# Patient Record
Sex: MALE | Race: WHITE | NOT HISPANIC OR LATINO | URBAN - METROPOLITAN AREA
[De-identification: names, ages, dates, MRNs, and addresses within clinical notes are randomized per-mention and may not be internally consistent; named-entity substitution may affect disease eponyms.]

---

## 2019-12-18 ENCOUNTER — APPOINTMENT (OUTPATIENT)
Dept: URBAN - METROPOLITAN AREA CLINIC 220 | Age: 19
Setting detail: DERMATOLOGY
End: 2019-12-30

## 2019-12-18 ENCOUNTER — RX ONLY (RX ONLY)
Age: 19
End: 2019-12-18

## 2019-12-18 DIAGNOSIS — L40.4 GUTTATE PSORIASIS: ICD-10-CM

## 2019-12-18 PROCEDURE — OTHER COUNSELING: OTHER

## 2019-12-18 PROCEDURE — OTHER TREATMENT REGIMEN: OTHER

## 2019-12-18 PROCEDURE — OTHER PRESCRIPTION: OTHER

## 2019-12-18 PROCEDURE — 99202 OFFICE O/P NEW SF 15 MIN: CPT

## 2019-12-18 PROCEDURE — OTHER MIPS QUALITY: OTHER

## 2019-12-18 RX ORDER — TRIAMCINOLONE ACETONIDE 1 MG/G
CREAM TOPICAL
Qty: 1 | Refills: 1 | Status: ERX | COMMUNITY
Start: 2019-12-18

## 2019-12-18 RX ORDER — BETAMETHASONE DIPROPIONATE 0.5 MG/G
SPRAY TOPICAL
Qty: 1 | Refills: 3 | Status: ERX

## 2019-12-18 ASSESSMENT — LOCATION SIMPLE DESCRIPTION DERM: LOCATION SIMPLE: CHEST

## 2019-12-18 ASSESSMENT — LOCATION ZONE DERM: LOCATION ZONE: TRUNK

## 2019-12-18 ASSESSMENT — LOCATION DETAILED DESCRIPTION DERM: LOCATION DETAILED: LEFT MEDIAL INFERIOR CHEST

## 2019-12-18 NOTE — PROCEDURE: MIPS QUALITY
Quality 431: Preventive Care And Screening: Unhealthy Alcohol Use - Screening: Patient screened for unhealthy alcohol use using a single question and scores less than 2 times per year
Quality 226: Preventive Care And Screening: Tobacco Use: Screening And Cessation Intervention: Patient screened for tobacco use and is an ex/non-smoker
Detail Level: Detailed
Quality 131: Pain Assessment And Follow-Up: Pain assessment using a standardized tool is documented as negative, no follow-up plan required
Quality 130: Documentation Of Current Medications In The Medical Record: Current Medications Documented
Quality 110: Preventive Care And Screening: Influenza Immunization: Influenza immunization was not ordered or administered, reason not given

## 2019-12-18 NOTE — PROCEDURE: TREATMENT REGIMEN
Samples Given: Cloderm - apply to rash on face twice a day for a few days at a time.  (Samples only)\\nSernivo spray (until he receives his rx)

## 2019-12-18 NOTE — PROCEDURE: TREATMENT REGIMEN
Start Regimen: Sernivo 0.05 % topical spray with pump \\nDays Supply: 30\\nSig: Apply to rash on body twice a day for up to 2 weeks at a time.  PRN flares. Initiate Regimen: Sernivo 0.05 % topical spray with pump \\nDays Supply: 30\\nSig: Apply to rash on body twice a day for up to 2 weeks at a time.  PRN flares.

## 2019-12-18 NOTE — PROCEDURE: TREATMENT REGIMEN
Other Instructions: Discussed with mom and patient the different options of treatment for his condition.  Patient was given a regimen and if rash is not resolved in 2 weeks prior to returning to school patient was advised to return to the office. Plan: Discussed with mom and patient the different options of treatment for his condition.  Patient was given a regimen and if rash is not resolved in 2 weeks prior to returning to school patient was advised to return to the office.

## 2020-01-31 ENCOUNTER — APPOINTMENT (OUTPATIENT)
Dept: URBAN - METROPOLITAN AREA CLINIC 211 | Age: 20
Setting detail: DERMATOLOGY
End: 2020-01-31

## 2020-01-31 DIAGNOSIS — L40.0 PSORIASIS VULGARIS: ICD-10-CM

## 2020-01-31 PROCEDURE — OTHER COUNSELING: OTHER

## 2020-01-31 PROCEDURE — OTHER TREATMENT REGIMEN: OTHER

## 2020-01-31 PROCEDURE — OTHER PRESCRIPTION: OTHER

## 2020-01-31 PROCEDURE — 99213 OFFICE O/P EST LOW 20 MIN: CPT

## 2020-01-31 PROCEDURE — OTHER MIPS QUALITY: OTHER

## 2020-01-31 RX ORDER — TRIAMCINOLONE ACETONIDE 1 MG/G
CREAM TOPICAL
Qty: 1 | Refills: 0 | Status: ERX

## 2020-01-31 ASSESSMENT — LOCATION SIMPLE DESCRIPTION DERM: LOCATION SIMPLE: CHEST

## 2020-01-31 ASSESSMENT — LOCATION ZONE DERM: LOCATION ZONE: TRUNK

## 2020-01-31 ASSESSMENT — LOCATION DETAILED DESCRIPTION DERM: LOCATION DETAILED: LEFT MEDIAL INFERIOR CHEST

## 2020-01-31 NOTE — PROCEDURE: TREATMENT REGIMEN
Plan: Discussed the following -\\ndiscontinuing all alcohol intake\\npossible MTX vs. Biologics \\n\\nRecommended tanning bed used 2-3 times weekly. Also submitting [employer] form for home UVB panel Other Instructions: Discussed the following -\\ndiscontinuing all alcohol intake\\npossible MTX vs. Biologics \\n\\nRecommended tanning bed used 2-3 times weekly. Also submitting [employer] form for home UVB panel

## 2020-01-31 NOTE — PROCEDURE: TREATMENT REGIMEN
Continue Regimen: triamcinolone acetonide 0.1 % topical cream (local pharmacy) - Apply to extremities twice daily x2 weeks PRN flares

## 2020-02-10 RX ORDER — TRIAMCINOLONE ACETONIDE 1 MG/G
CREAM TOPICAL
Qty: 1 | Refills: 0 | Status: ERX

## 2020-03-09 ENCOUNTER — APPOINTMENT (OUTPATIENT)
Dept: URBAN - METROPOLITAN AREA CLINIC 220 | Age: 20
Setting detail: DERMATOLOGY
End: 2020-03-10

## 2020-03-09 DIAGNOSIS — B07.8 OTHER VIRAL WARTS: ICD-10-CM

## 2020-03-09 DIAGNOSIS — L40.0 PSORIASIS VULGARIS: ICD-10-CM

## 2020-03-09 PROCEDURE — OTHER COUNSELING: OTHER

## 2020-03-09 PROCEDURE — OTHER LIQUID NITROGEN: OTHER

## 2020-03-09 PROCEDURE — OTHER TREATMENT REGIMEN: OTHER

## 2020-03-09 PROCEDURE — 17110 DESTRUCT B9 LESION 1-14: CPT

## 2020-03-09 PROCEDURE — OTHER OTEZLA INITIATION: OTHER

## 2020-03-09 PROCEDURE — 99214 OFFICE O/P EST MOD 30 MIN: CPT | Mod: 25

## 2020-03-09 PROCEDURE — OTHER MIPS QUALITY: OTHER

## 2020-03-09 PROCEDURE — OTHER PRESCRIPTION: OTHER

## 2020-03-09 RX ORDER — CALCIPOTRIENE 50 UG/G
AEROSOL, FOAM TOPICAL
Qty: 1 | Refills: 5 | Status: ERX | COMMUNITY
Start: 2020-03-09

## 2020-03-09 RX ORDER — HALOBETASOL PROPIONATE 0.5 MG/G
AEROSOL, FOAM TOPICAL
Qty: 1 | Refills: 5 | Status: ERX | COMMUNITY
Start: 2020-03-09

## 2020-03-09 ASSESSMENT — LOCATION ZONE DERM
LOCATION ZONE: LEG
LOCATION ZONE: ARM
LOCATION ZONE: FINGER
LOCATION ZONE: TRUNK

## 2020-03-09 ASSESSMENT — LOCATION SIMPLE DESCRIPTION DERM
LOCATION SIMPLE: LEFT THIGH
LOCATION SIMPLE: RIGHT UPPER BACK
LOCATION SIMPLE: RIGHT UPPER ARM
LOCATION SIMPLE: LEFT THUMB
LOCATION SIMPLE: LEFT FOREARM
LOCATION SIMPLE: RIGHT THIGH

## 2020-03-09 ASSESSMENT — LOCATION DETAILED DESCRIPTION DERM
LOCATION DETAILED: RIGHT ANTERIOR PROXIMAL THIGH
LOCATION DETAILED: LEFT ANTERIOR PROXIMAL THIGH
LOCATION DETAILED: RIGHT SUPERIOR UPPER BACK
LOCATION DETAILED: LEFT DISTAL ULNAR THUMB
LOCATION DETAILED: LEFT VENTRAL DISTAL FOREARM
LOCATION DETAILED: RIGHT ANTERIOR PROXIMAL UPPER ARM

## 2020-03-09 ASSESSMENT — BSA PSORIASIS: % BODY COVERED IN PSORIASIS: 85

## 2020-03-09 NOTE — PROCEDURE: MIPS QUALITY
Quality 131: Pain Assessment And Follow-Up: Pain assessment using a standardized tool is documented as negative, no follow-up plan required
Quality 226: Preventive Care And Screening: Tobacco Use: Screening And Cessation Intervention: Patient screened for tobacco use and is an ex/non-smoker
Detail Level: Detailed
Quality 110: Preventive Care And Screening: Influenza Immunization: Influenza immunization was not ordered or administered, reason not given
Quality 130: Documentation Of Current Medications In The Medical Record: Current Medications Documented
Quality 431: Preventive Care And Screening: Unhealthy Alcohol Use - Screening: Patient screened for unhealthy alcohol use using a single question and scores less than 2 times per year

## 2020-03-09 NOTE — PROCEDURE: LIQUID NITROGEN
Consent: The patient's consent was obtained including but not limited to risks of crusting, scabbing, blistering, scarring, darker or lighter pigmentary change, recurrence, incomplete removal and infection.
Render Post-Care Instructions In Note?: no
Detail Level: Simple
Post-Care Instructions: I reviewed with the patient in detail post-care instructions. Patient is to wear sunprotection, and avoid picking at any of the treated lesions. Pt may apply Vaseline to crusted or scabbing areas.
Medical Necessity Clause: This procedure was medically necessary because the lesions that were treated were:
Medical Necessity Information: It is in your best interest to select a reason for this procedure from the list below. All of these items fulfill various CMS LCD requirements except the new and changing color options.

## 2020-03-09 NOTE — PROCEDURE: TREATMENT REGIMEN
Plan: 1. Initiate Otezla- paperwork signed and starter pack provided- discussed trial of 3-4 months before determining if working or not. \\n2. Lab Slip given for CBC with diff, CMP- will have drawn today \\n3. Initiate Lexette-BID  to Your RX pharmacy\\n4. Initiate Sorilux-BID to Your RX pharmacy Other Instructions: 1. Initiate Otezla- paperwork signed and starter pack provided- discussed trial of 3-4 months before determining if working or not. \\n2. Lab Slip given for CBC with diff, CMP- will have drawn today \\n3. Initiate Lexette-BID  to Your RX pharmacy\\n4. Initiate Sorilux-BID to Your RX pharmacy

## 2020-03-10 ENCOUNTER — RX ONLY (RX ONLY)
Age: 20
End: 2020-03-10

## 2020-10-22 ENCOUNTER — APPOINTMENT (OUTPATIENT)
Dept: URBAN - METROPOLITAN AREA CLINIC 211 | Age: 20
Setting detail: DERMATOLOGY
End: 2020-10-22

## 2020-10-22 DIAGNOSIS — B07.8 OTHER VIRAL WARTS: ICD-10-CM

## 2020-10-22 PROCEDURE — 17110 DESTRUCT B9 LESION 1-14: CPT

## 2020-10-22 PROCEDURE — OTHER MIPS QUALITY: OTHER

## 2020-10-22 PROCEDURE — OTHER COUNSELING: OTHER

## 2020-10-22 PROCEDURE — OTHER LIQUID NITROGEN: OTHER

## 2020-10-22 ASSESSMENT — LOCATION DETAILED DESCRIPTION DERM
LOCATION DETAILED: LEFT DISTAL RADIAL THUMB
LOCATION DETAILED: LEFT DISTAL ULNAR THUMB
LOCATION DETAILED: LEFT RADIAL PALM

## 2020-10-22 ASSESSMENT — LOCATION ZONE DERM
LOCATION ZONE: FINGER
LOCATION ZONE: HAND

## 2020-10-22 ASSESSMENT — LOCATION SIMPLE DESCRIPTION DERM
LOCATION SIMPLE: LEFT THUMB
LOCATION SIMPLE: LEFT HAND

## 2020-10-22 NOTE — PROCEDURE: LIQUID NITROGEN
Post-Care Instructions: I reviewed with the patient in detail post-care instructions. Patient is to wear sunprotection, and avoid picking at any of the treated lesions. Pt may apply Vaseline to crusted or scabbing areas.
Pared With?: 15 blade
Consent: The patient's consent was obtained including but not limited to risks of crusting, scabbing, blistering, scarring, darker or lighter pigmentary change, recurrence, incomplete removal and infection.
Medical Necessity Information: It is in your best interest to select a reason for this procedure from the list below. All of these items fulfill various CMS LCD requirements except the new and changing color options.
Include Z78.9 (Other Specified Conditions Influencing Health Status) As An Associated Diagnosis?: No
Detail Level: Detailed
Medical Necessity Clause: This procedure was medically necessary because the lesions that were treated were:

## 2020-10-22 NOTE — PROCEDURE: MIPS QUALITY
Quality 131: Pain Assessment And Follow-Up: Pain assessment using a standardized tool is documented as negative, no follow-up plan required
Detail Level: Detailed
Quality 130: Documentation Of Current Medications In The Medical Record: Current Medications Documented
Quality 110: Preventive Care And Screening: Influenza Immunization: Influenza immunization was not ordered or administered, reason not given
Quality 226: Preventive Care And Screening: Tobacco Use: Screening And Cessation Intervention: Patient screened for tobacco use and is an ex/non-smoker
Quality 431: Preventive Care And Screening: Unhealthy Alcohol Use - Screening: Patient screened for unhealthy alcohol use using a single question and scores less than 2 times per year